# Patient Record
Sex: FEMALE | URBAN - METROPOLITAN AREA
[De-identification: names, ages, dates, MRNs, and addresses within clinical notes are randomized per-mention and may not be internally consistent; named-entity substitution may affect disease eponyms.]

---
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
## 2020-03-14 ENCOUNTER — VIRTUAL VISIT (OUTPATIENT)
Dept: FAMILY MEDICINE | Facility: OTHER | Age: 20
End: 2020-03-14
Payer: COMMERCIAL

## 2020-03-17 NOTE — PROGRESS NOTES
"Date: 2020 12:17:34  Clinician: Rasheed Ordonez  Clinician NPI: [id number]  Patient: Inocencio Waterman  Patient : 2000  Patient Address: [address] [place] [address]  Patient Phone: [phone]  Visit Protocol: URI  Patient Summary:  Inocencio is a 19 year old ( : 2000 ) female who initiated a Visit for cold, sinus infection, or influenza. When asked the question \"Please sign me up to receive news, health information and promotions from [facility].\", Inocencio responded \"No\".    Inocencio states her symptoms started today.   Her symptoms consist of malaise, a headache, myalgia, and chills.   Symptom details   Headache: She states the headache is moderate (4-6 on a 10 point pain scale).    Inocencio denies having teeth pain, ear pain, rhinitis, facial pain or pressure, wheezing, sore throat, cough, nasal congestion, and fever. She also denies having recent facial or sinus surgery in the past 60 days and taking antibiotic medication for the symptoms. She is not experiencing dyspnea.   Precipitating events  She has not recently been exposed to someone with influenza. Inocencio has been in close contact with the following high risk individuals: adults 65 or older and children under the age of 5.   Pertinent COVID-19 (Coronavirus) information  Inocencio has not traveled internationally or to the areas where COVID-19 (Coronavirus) is widespread in the last 14 days before the start of her symptoms.   Inocencio has not had close contact with a suspected or laboratory-confirmed COVID-19 patient within 14 days of symptom onset.   Inocencio is not a healthcare worker or does not work in a healthcare facility.   Pertinent medical history  Inocencio does not get yeast infections when she takes antibiotics.   Inocencio does not need a return to work/school note.   Weight: 116 lbs   Inocencio does not smoke or use smokeless tobacco.   She denies pregnancy and denies breastfeeding. She has menstruated in the past month.   Weight: 116 lbs    " MEDICATIONS: No current medications, ALLERGIES: NKDA  Clinician Response:  Dear Inocencio,   It does sound like you have a viral illness, but it's not likely that this is COVID. This could be a viral URI (common cold) or influenza. In either of these situations, the recommendation would be for you to stay hydrated, treat your symptoms with medications like tylenol and ibuprofen, avoid spreading illness by avoiding people until your symptoms are better, washing your hands and monitoring for any worsening of your symptoms.     Diagnosis: Cough  Diagnosis ICD: R05

## 2021-04-07 ENCOUNTER — AMBULATORY - HEALTHEAST (OUTPATIENT)
Dept: NURSING | Facility: CLINIC | Age: 21
End: 2021-04-07

## 2021-04-28 ENCOUNTER — AMBULATORY - HEALTHEAST (OUTPATIENT)
Dept: NURSING | Facility: CLINIC | Age: 21
End: 2021-04-28

## 2023-02-06 ENCOUNTER — LAB REQUISITION (OUTPATIENT)
Dept: LAB | Facility: CLINIC | Age: 23
End: 2023-02-06

## 2023-02-06 DIAGNOSIS — Z01.419 ENCOUNTER FOR GYNECOLOGICAL EXAMINATION (GENERAL) (ROUTINE) WITHOUT ABNORMAL FINDINGS: ICD-10-CM

## 2023-02-06 PROCEDURE — G0145 SCR C/V CYTO,THINLAYER,RESCR: HCPCS | Performed by: NURSE PRACTITIONER

## 2023-02-06 PROCEDURE — 87624 HPV HI-RISK TYP POOLED RSLT: CPT | Performed by: NURSE PRACTITIONER

## 2023-02-09 LAB
BKR LAB AP GYN ADEQUACY: NORMAL
BKR LAB AP GYN INTERPRETATION: NORMAL
BKR LAB AP HPV REFLEX: NORMAL
BKR LAB AP LMP: NORMAL
BKR LAB AP PREVIOUS ABNL DX: NORMAL
BKR LAB AP PREVIOUS ABNORMAL: NORMAL
PATH REPORT.COMMENTS IMP SPEC: NORMAL
PATH REPORT.COMMENTS IMP SPEC: NORMAL
PATH REPORT.RELEVANT HX SPEC: NORMAL

## 2023-02-14 LAB
HUMAN PAPILLOMA VIRUS 16 DNA: NEGATIVE
HUMAN PAPILLOMA VIRUS 18 DNA: NEGATIVE
HUMAN PAPILLOMA VIRUS FINAL DIAGNOSIS: NORMAL
HUMAN PAPILLOMA VIRUS OTHER HR: NEGATIVE